# Patient Record
Sex: MALE | Race: BLACK OR AFRICAN AMERICAN | NOT HISPANIC OR LATINO | Employment: FULL TIME | URBAN - METROPOLITAN AREA
[De-identification: names, ages, dates, MRNs, and addresses within clinical notes are randomized per-mention and may not be internally consistent; named-entity substitution may affect disease eponyms.]

---

## 2017-12-31 ENCOUNTER — HOSPITAL ENCOUNTER (EMERGENCY)
Facility: CLINIC | Age: 37
Discharge: HOME | End: 2017-12-31
Attending: EMERGENCY MEDICINE | Admitting: EMERGENCY MEDICINE
Payer: OTHER MISCELLANEOUS

## 2017-12-31 VITALS
OXYGEN SATURATION: 99 % | WEIGHT: 175 LBS | BODY MASS INDEX: 24.5 KG/M2 | DIASTOLIC BLOOD PRESSURE: 88 MMHG | RESPIRATION RATE: 18 BRPM | HEIGHT: 71 IN | SYSTOLIC BLOOD PRESSURE: 150 MMHG | TEMPERATURE: 99.8 F

## 2017-12-31 DIAGNOSIS — T33.90XA FROSTNIP, INITIAL ENCOUNTER: ICD-10-CM

## 2017-12-31 PROCEDURE — 99283 EMERGENCY DEPT VISIT LOW MDM: CPT

## 2017-12-31 PROCEDURE — 25000132 ZZH RX MED GY IP 250 OP 250 PS 637: Performed by: EMERGENCY MEDICINE

## 2017-12-31 RX ORDER — IBUPROFEN 600 MG/1
600 TABLET, FILM COATED ORAL ONCE
Status: COMPLETED | OUTPATIENT
Start: 2017-12-31 | End: 2017-12-31

## 2017-12-31 RX ADMIN — IBUPROFEN 600 MG: 600 TABLET ORAL at 12:10

## 2017-12-31 ASSESSMENT — ENCOUNTER SYMPTOMS: COLOR CHANGE: 0

## 2017-12-31 NOTE — ED AVS SNAPSHOT
Emergency Department    [address] [facility] [address]    Phone:  [phone]    Fax:  [phone]                                       Elbert Mckeon   MRN: [id number]    Department:   Emergency Department   Date of Visit:  12/31/2017           Patient Information     Date Of Birth          1980        Your diagnoses for this visit were:     Frostnip, initial encounter        You were seen by Tio Evans MD.      Follow-up Information     Schedule an appointment as soon as possible for a visit to follow up.    Why:  with your physician        Follow up with  Emergency Department.    Specialty:  EMERGENCY MEDICINE    Why:  If symptoms worsen    Contact information:    [address] [facility] [address] [phone]        Discharge Instructions       Ibuprofen (600mg) given @ 12:05PM.  May take every 6 hours as needed with food.    Frostnip  Frostbite results from freezing of the tissue. You have frostnip, which is a mild form of frostbite. Unlike frostbite, frostnip does not cause permanent damage to tissues. Frostnip most often affects the earlobes, nose, cheeks, fingers, toes, hands and feet. It causes pain, numbness, and changes in skin color.  To treat frostnip, the affected body part is rewarmed, often with warm water. This may cause pain and tingling.  Home care    Care for the affected body part as instructed.    Protect the part from further exposure to the cold.    Take steps to prevent cold injury in the future (see below).  Follow-up care  Follow up with your healthcare provider or as advised.  Preventing frostbite and frostnip  To prevent frostbite:    Dress for the weather. Wear enough layers to keep you warm. Cover exposed body parts to protect them from the cold.    Eat enough food.    Avoid alcohol and smoking. They make the skin more sensitive to cold.    Avoid getting wet when you are exposed to the cold.    Carry emergency supplies  when you are out in the elements.    If you use an ice pack, wrap it in a thin towel, and only use it for up to 15 minutes every 1 to 2 hours.  When to seek medical advice  Call your healthcare provider for any of the following:    Body temperature is less than 95 F (35 C)    Skin color and feeling does not return to normal after 1 hour of rewarming    Increasing pain or swelling    Appearance of clear or blood-filled blisters  Date Last Reviewed: 5/1/2017 2000-2017 [employer]. [address] [place], [place] [address]. All rights reserved. This information is not intended as a substitute for professional medical care. Always follow your healthcare professional's instructions.              Frostbite  Frostbite is a freezing injury to the body's tissues caused by prolonged exposure to cold. It can cause permanent damage to the body. The most common places affected by frostbite are the fingers, toes, cheeks, chin, ears, and nose. Ice put directly on the skin and left too long can also lead to frostbite.  Frostbite causes the skin to turn white, grey, or blue-white. The skin may feel cold and hard. The body part often loses feeling and becomes completely numb. The skin may peel. Clear or blood-filled blisters may form. As the damage gets worse, the skin may turn black.  The treatment for frostbite is careful rewarming. In the hospital, this is done using warm water. During this process, the frostbitten part will start to throb. Pain medicine will likely be given to help make the process less painful. Other medicines including blood thinners (medicines that dissolve blood clots), and medicines that widen blood vessels to improve blood flow to the frostbitten area may be used in severe cases.  Rewarmed tissue will be watched to see if it recovers. If tissue dies, it may need to be removed with surgery.    The affected body part may throb for weeks to months. Tingling or electric shock feelings may also  be felt. There may be cold sensitivity, chronic numbness, chronic pain, and other symptoms that can last years.  Home care    Care for the injured body part as directed by your healthcare provider. Protect the affected part from cold and other injury.    Unless another medicine was prescribed, you can take over-the-counter pain medicines.    As the tissue heals, watch for the signs of infection listed below.    Avoid alcohol and smoking. These affect your blood vessels.  Follow-up care  Follow up with your healthcare provider, or as advised. Further evaluation of the affected part may be needed.  Protect the injured part from any exposure to cold for at least 6 months to a year or longer. The affected part is likely to always be more sensitive to damage from cold.  Preventing frostbite  To prevent frostbite, do the following in cold weather:    Dress for the weather. Wear enough layers to keep you warm. Cover exposed body parts to protect them from the cold.    Eat enough food.    Avoid alcohol and smoking. They make the skin more sensitive to cold.    Avoid getting wet.    Carry emergency supplies when you are out in the elements.    If you use an ice pack, wrap it in a thin towel, and only use it for up to 15 minutes every 1 to 2 hours.  When to seek medical advice  Call your healthcare provider if you have:    Signs of infection:    Increasing pain, redness, or swelling    Pus coming from the wound    Fever of 100.4 F (38 C) or higher  Date Last Reviewed: 7/1/2017 2000-2017 [employer]. [address] [place] [address]. All rights reserved. This information is not intended as a substitute for professional medical care. Always follow your healthcare professional's instructions.          24 Hour Appointment Hotline       To make an appointment at any [facility] clinic, call [phone] ([phone]). If you don't have a family doctor or clinic, we will help you find one. [facility]  clinics are conveniently located to serve the needs of you and your family.             Review of your medicines      Notice     You have not been prescribed any medications.            Orders Needing Specimen Collection     None      Pending Results     No orders found from 12/29/2017 to 1/1/2018.            Pending Culture Results     No orders found from 12/29/2017 to 1/1/2018.            Pending Results Instructions     If you had any lab results that were not finalized at the time of your Discharge, you can call the ED Lab Result RN at [phone]. You will be contacted by this team for any positive Lab results or changes in treatment. The nurses are available 7 days a week from 10A to 6:30P.  You can leave a message 24 hours per day and they will return your call.        Test Results From Your Hospital Stay               Clinical Quality Measure: Blood Pressure Screening     Your blood pressure was checked while you were in the emergency department today. The last reading we obtained was  BP: 150/88 . Please read the guidelines below about what these numbers mean and what you should do about them.  If your systolic blood pressure (the top number) is less than 120 and your diastolic blood pressure (the bottom number) is less than 80, then your blood pressure is normal. There is nothing more that you need to do about it.  If your systolic blood pressure (the top number) is 120-139 or your diastolic blood pressure (the bottom number) is 80-89, your blood pressure may be higher than it should be. You should have your blood pressure rechecked within a year by a primary care provider.  If your systolic blood pressure (the top number) is 140 or greater or your diastolic blood pressure (the bottom number) is 90 or greater, you may have high blood pressure. High blood pressure is treatable, but if left untreated over time it can put you at risk for heart attack, stroke, or kidney failure. You should have your blood  "pressure rechecked by a primary care provider within the next 4 weeks.  If your provider in the emergency department today gave you specific instructions to follow-up with your doctor or provider even sooner than that, you should follow that instruction and not wait for up to 4 weeks for your follow-up visit.        Thank you for choosing [facility]       Thank you for choosing [facility] for your care. Our goal is always to provide you with excellent care. Hearing back from our patients is one way we can continue to improve our services. Please take a few minutes to complete the written survey that you may receive in the mail after you visit with us. Thank you!        [employer] Information     [employer] lets you send messages to your doctor, view your test results, renew your prescriptions, schedule appointments and more. To sign up, go to [email or web address] . Click on \"Log in\" on the left side of the screen, which will take you to the Welcome page. Then click on \"Sign up Now\" on the right side of the page.     You will be asked to enter the access code listed below, as well as some personal information. Please follow the directions to create your username and password.     Your access code is: [email or web address]  Expires: 3/31/2018 11:56 AM     Your access code will  in 90 days. If you need help or a new code, please call your [facility] clinic or [phone].        Care EveryWhere ID     This is your Care EveryWhere ID. This could be used by other organizations to access your [facility] medical records  [id number]        Equal Access to Services     MARCIAL FLETCHER : Hadii alex sahu hadasho Sonhanali, waaxda luqadaha, qaybta kaalmada adeegyada, vinicius abad . So [facility] [phone].    ATENCIÓN: Si habla español, tiene a bruce disposición servicios gratuitos de asistencia lingüística. Llame al [phone].    We comply with applicable federal civil rights laws and [place] laws. We do not " discriminate on the basis of race, color, national origin, age, disability, sex, sexual orientation, or gender identity.            After Visit Summary       This is your record. Keep this with you and show to your community pharmacist(s) and doctor(s) at your next visit.

## 2017-12-31 NOTE — LETTER
December 31, 2017      To Whom It May Concern:      Elbert Mckeon was seen in our Emergency Department today, 12/31/17.  He should not have his hands exposed to the cold air/weather for 3 weeks.    Sincerely,        Tio Evans MD

## 2017-12-31 NOTE — DISCHARGE INSTRUCTIONS
Ibuprofen (600mg) given @ 12:05PM.  May take every 6 hours as needed with food.    Frostnip  Frostbite results from freezing of the tissue. You have frostnip, which is a mild form of frostbite. Unlike frostbite, frostnip does not cause permanent damage to tissues. Frostnip most often affects the earlobes, nose, cheeks, fingers, toes, hands and feet. It causes pain, numbness, and changes in skin color.  To treat frostnip, the affected body part is rewarmed, often with warm water. This may cause pain and tingling.  Home care    Care for the affected body part as instructed.    Protect the part from further exposure to the cold.    Take steps to prevent cold injury in the future (see below).  Follow-up care  Follow up with your healthcare provider or as advised.  Preventing frostbite and frostnip  To prevent frostbite:    Dress for the weather. Wear enough layers to keep you warm. Cover exposed body parts to protect them from the cold.    Eat enough food.    Avoid alcohol and smoking. They make the skin more sensitive to cold.    Avoid getting wet when you are exposed to the cold.    Carry emergency supplies when you are out in the elements.    If you use an ice pack, wrap it in a thin towel, and only use it for up to 15 minutes every 1 to 2 hours.  When to seek medical advice  Call your healthcare provider for any of the following:    Body temperature is less than 95 F (35 C)    Skin color and feeling does not return to normal after 1 hour of rewarming    Increasing pain or swelling    Appearance of clear or blood-filled blisters  Date Last Reviewed: 5/1/2017 2000-2017 [employer]. [address] [place] [address]. All rights reserved. This information is not intended as a substitute for professional medical care. Always follow your healthcare professional's instructions.              Frostbite  Frostbite is a freezing injury to the body's tissues caused by prolonged exposure to cold. It can  cause permanent damage to the body. The most common places affected by frostbite are the fingers, toes, cheeks, chin, ears, and nose. Ice put directly on the skin and left too long can also lead to frostbite.  Frostbite causes the skin to turn white, grey, or blue-white. The skin may feel cold and hard. The body part often loses feeling and becomes completely numb. The skin may peel. Clear or blood-filled blisters may form. As the damage gets worse, the skin may turn black.  The treatment for frostbite is careful rewarming. In the hospital, this is done using warm water. During this process, the frostbitten part will start to throb. Pain medicine will likely be given to help make the process less painful. Other medicines including blood thinners (medicines that dissolve blood clots), and medicines that widen blood vessels to improve blood flow to the frostbitten area may be used in severe cases.  Rewarmed tissue will be watched to see if it recovers. If tissue dies, it may need to be removed with surgery.    The affected body part may throb for weeks to months. Tingling or electric shock feelings may also be felt. There may be cold sensitivity, chronic numbness, chronic pain, and other symptoms that can last years.  Home care    Care for the injured body part as directed by your healthcare provider. Protect the affected part from cold and other injury.    Unless another medicine was prescribed, you can take over-the-counter pain medicines.    As the tissue heals, watch for the signs of infection listed below.    Avoid alcohol and smoking. These affect your blood vessels.  Follow-up care  Follow up with your healthcare provider, or as advised. Further evaluation of the affected part may be needed.  Protect the injured part from any exposure to cold for at least 6 months to a year or longer. The affected part is likely to always be more sensitive to damage from cold.  Preventing frostbite  To prevent frostbite, do the  following in cold weather:    Dress for the weather. Wear enough layers to keep you warm. Cover exposed body parts to protect them from the cold.    Eat enough food.    Avoid alcohol and smoking. They make the skin more sensitive to cold.    Avoid getting wet.    Carry emergency supplies when you are out in the elements.    If you use an ice pack, wrap it in a thin towel, and only use it for up to 15 minutes every 1 to 2 hours.  When to seek medical advice  Call your healthcare provider if you have:    Signs of infection:    Increasing pain, redness, or swelling    Pus coming from the wound    Fever of 100.4 F (38 C) or higher  Date Last Reviewed: 7/1/2017 2000-2017 [employer]. [address] [place] [address], [place] [address]. All rights reserved. This information is not intended as a substitute for professional medical care. Always follow your healthcare professional's instructions.
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
## 2017-12-31 NOTE — ED AVS SNAPSHOT
Emergency Department    [address] [place] [address]    Phone:  [phone]    Fax:  [phone]                                       Elbert Mckeon   MRN: [id number]    Department:   Emergency Department   Date of Visit:  12/31/2017           After Visit Summary Signature Page     I have received my discharge instructions, and my questions have been answered. I have discussed any challenges I see with this plan with the nurse or doctor.    ..........................................................................................................................................  Patient/Patient Representative Signature      ..........................................................................................................................................  Patient Representative Print Name and Relationship to Patient    ..................................................               ................................................  Date                                            Time    ..........................................................................................................................................  Reviewed by Signature/Title    ...................................................              ..............................................  Date                                                            Time

## 2021-05-28 ENCOUNTER — RECORDS - HEALTHEAST (OUTPATIENT)
Dept: ADMINISTRATIVE | Facility: CLINIC | Age: 41
End: 2021-05-28

## 2024-11-07 ENCOUNTER — APPOINTMENT (OUTPATIENT)
Dept: CT IMAGING | Facility: CLINIC | Age: 44
End: 2024-11-07
Attending: PHYSICIAN ASSISTANT
Payer: COMMERCIAL

## 2024-11-07 ENCOUNTER — HOSPITAL ENCOUNTER (EMERGENCY)
Facility: CLINIC | Age: 44
Discharge: HOME | End: 2024-11-07
Attending: PHYSICIAN ASSISTANT | Admitting: PHYSICIAN ASSISTANT
Payer: COMMERCIAL

## 2024-11-07 VITALS
OXYGEN SATURATION: 100 % | RESPIRATION RATE: 16 BRPM | WEIGHT: 180 LBS | HEIGHT: 71 IN | BODY MASS INDEX: 25.2 KG/M2 | SYSTOLIC BLOOD PRESSURE: 143 MMHG | TEMPERATURE: 97.7 F | DIASTOLIC BLOOD PRESSURE: 78 MMHG | HEART RATE: 77 BPM

## 2024-11-07 DIAGNOSIS — M54.2 ACUTE NECK PAIN: ICD-10-CM

## 2024-11-07 DIAGNOSIS — S09.90XA HEAD INJURY, INITIAL ENCOUNTER: ICD-10-CM
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
## 2024-11-07 PROCEDURE — 70450 CT HEAD/BRAIN W/O DYE: CPT

## 2024-11-07 PROCEDURE — 72125 CT NECK SPINE W/O DYE: CPT

## 2024-11-07 PROCEDURE — 99284 EMERGENCY DEPT VISIT MOD MDM: CPT | Mod: 25

## 2024-11-07 RX ORDER — CYCLOBENZAPRINE HCL 5 MG
5 TABLET ORAL 3 TIMES DAILY PRN
Qty: 21 TABLET | Refills: 0 | Status: SHIPPED | OUTPATIENT
Start: 2024-11-07

## 2024-11-07 ASSESSMENT — COLUMBIA-SUICIDE SEVERITY RATING SCALE - C-SSRS
2. HAVE YOU ACTUALLY HAD ANY THOUGHTS OF KILLING YOURSELF IN THE PAST MONTH?: NO
6. HAVE YOU EVER DONE ANYTHING, STARTED TO DO ANYTHING, OR PREPARED TO DO ANYTHING TO END YOUR LIFE?: NO
1. IN THE PAST MONTH, HAVE YOU WISHED YOU WERE DEAD OR WISHED YOU COULD GO TO SLEEP AND NOT WAKE UP?: NO

## 2024-11-07 ASSESSMENT — ACTIVITIES OF DAILY LIVING (ADL)
ADLS_ACUITY_SCORE: 0
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
## 2025-02-25 NOTE — ED NOTES
Bed: ED22  Expected date:   Expected time:   Means of arrival:   Comments:  RN 3 can take one in 22   175.26